# Patient Record
(demographics unavailable — no encounter records)

---

## 2024-12-05 NOTE — ASSESSMENT
[FreeTextEntry1] : 49 yo male with BPH and mild LUTS. His ultrasound demonstrates a 43 g gland and PVR of 14 ml.  Given symptoms are mild would not recommend any medication at this time.  He also has low libido.  He will have testosterone drawn and if low will call patient to come into the office to repeat a.m. testosterone  Plan Urinalysis  Urine culture Testosterone - will call with results and if low will repeat fasting AM  Reviewed images of patient's pelvic ultrasound discussed results with the patient demonstrating a prostate volume of 43 cc and postvoid residual 14 mL demonstrating the patient's emptying his bladder Will call patient following testosterone results and instruct neck steps of care   Patient is being seen today for evaluation and management of a chronic and longitudinal ongoing condition and I am of the primary treating physician.

## 2024-12-05 NOTE — HISTORY OF PRESENT ILLNESS
[FreeTextEntry1] : 49 yo male found to have BPH. States he has intermittent weak stream, incomplete emptying, denies nocturia.   Pelvic ultrasound done at [facility] December 2, 2024 demonstrates a 43 cc prostate and postvoid residual of 14 mL.  States that he was exercising heavily and developed left inguinal pain and discomfort which is exacerbated by hip stretching but no pain with heavy lifting.   He also notes some decreasing energy and low libido